# Patient Record
Sex: FEMALE | Race: WHITE | NOT HISPANIC OR LATINO | URBAN - METROPOLITAN AREA
[De-identification: names, ages, dates, MRNs, and addresses within clinical notes are randomized per-mention and may not be internally consistent; named-entity substitution may affect disease eponyms.]

---

## 2017-11-02 ENCOUNTER — OFFICE VISIT - HEALTHEAST (OUTPATIENT)
Dept: FAMILY MEDICINE | Facility: CLINIC | Age: 67
End: 2017-11-02

## 2017-11-02 DIAGNOSIS — H61.20 CERUMEN IMPACTION: ICD-10-CM

## 2021-05-27 ENCOUNTER — RECORDS - HEALTHEAST (OUTPATIENT)
Dept: ADMINISTRATIVE | Facility: CLINIC | Age: 71
End: 2021-05-27

## 2021-05-29 ENCOUNTER — RECORDS - HEALTHEAST (OUTPATIENT)
Dept: ADMINISTRATIVE | Facility: CLINIC | Age: 71
End: 2021-05-29

## 2021-05-31 VITALS — BODY MASS INDEX: 19.97 KG/M2 | WEIGHT: 120 LBS

## 2021-06-13 NOTE — PROGRESS NOTES
"ASSESSMENT/PLAN:   1. Cerumen impaction  Nursing communication    carbamide peroxide (DEBROX) 6.5 % otic solution     Cerumen impaction noted in left ear. With ear lavage, cerumen impaction removed. No evidence for otitis media or externa on repeat exam.   Despite complaints of headaches, no red flags on history and no focal neuro deficits on thorough neuro exam. No further workup indicated at this point.    At the end of the encounter, I discussed results, diagnosis, medications. Discussed red flags for immediate return to clinic/ER, as well as indications for follow up if no improvement. Patient understood and agreed to plan. Patient was appropriate for discharge.      Patient Instructions:  Patient Instructions   We completely removed your earwax impaction today.    May use Debrox drops in the future if this happens again.    No Qtips or anything else in the ears.    Establish with primary care provider for regular wellness visits.                    SUBJECTIVE:   Shashank Cueva is a 67 y.o. female, otherwise healthy, who presents today for evaluation of left ear fullness x 2 days. She admits to decreased hearing in the left ear. She says she \"got water in it.\" She tried using OTC swimmer's ear drops but this did not help.  Ear does not hurt. She admits she has been having headaches over the last few days. No vision changes, dizziness, ringing in the ears. No nausea, vomiting, fever. No nasal congestion or sinus pressure. No sore throat. She admits to \"some allergy symptoms.\" She tried taking Sudafed this morning which has not changed ear symptoms.    Past Medical History:  Past Medical History:   Diagnosis Date     Cerebral aneurysm          Surgical History:  Past Surgical History:   Procedure Laterality Date     CEREBRAL ANEURYSM REPAIR  2004    not ruptured, found on prophylactic scan         Family History:  Reviewed; Non-contributory      Social History:    History   Smoking Status     Never Smoker "   Smokeless Tobacco     Never Used     Smoking: none  Alcohol use: occasionally  Other drug use: none    Current Medications:  Current Outpatient Prescriptions on File Prior to Visit   Medication Sig Dispense Refill     sulfamethoxazole-trimethoprim (SEPTRA) 400-80 mg per tablet 2 tablets twice daily for 5 days then 1 tablet as needed for prevention of UTI morning after sexual activity 40 tablet 4     No current facility-administered medications on file prior to visit.        Allergies:   Allergies   Allergen Reactions     Codeine Nausea Only and Dizziness       I personally reviewed patient's past medical, surgical, social, family history and allergies.    ROS:  Review of Systems  See HPI for full 8pt ROS, otherwise negative      OBJECTIVE:   Vitals:    11/02/17 1326   BP: 130/64   Pulse: 80   Resp: 12   Temp: 98.2  F (36.8  C)   TempSrc: Oral   SpO2: 99%   Weight: 120 lb (54.4 kg)       General Appearance:  Alert, well-appearing female in NAD. Afebrile.    HEENT:  Head: Atraumatic, normocephalic. Face nontraumatic.  Eyes: Conjunctiva clear, Lids normal. PERRL. EOMI.  Ears:  Left ear with cerumen impaction. No TM erythema, edema. No pain with auricle manipulation. No canal erythema or edema. Right ear canal without cerumen. TM pearly, translucent. No canal erythema or edema.  Nose: nares patent. No rhinorrhea.  Oropharynx: No posterior pharyngeal erythema. No tonsillar hypertrophy. Uvula midline. Moist mucus membranes.  Neck: Supple, no lymphadenopathy.   Respiratory: No distress. Lungs clear to ausculation bilaterally. No crackles, wheezes, rhonchi or stridor.  Cardiovascular: Regular rate and rhythm, no murmur, rub or gallop.  Neurologic: Alert & oriented appropriately. Normal tone. PERRL. Normal speech, no dysarthria. CN 2 full visual fields, 3/4/6 EOMI without nystagmus, 5 Sensory intact, 7 Motor intact, face symmetric, 8 Hearing intact, 9,10 11 normal strength, 12 Tongue midline.  Motor: MAEE.  Intact speech  and coordination. Follows commands. Gait: steady gait, no antalgia.  Psych: Normal mood and affect.